# Patient Record
Sex: FEMALE
[De-identification: names, ages, dates, MRNs, and addresses within clinical notes are randomized per-mention and may not be internally consistent; named-entity substitution may affect disease eponyms.]

---

## 2021-10-15 ENCOUNTER — NURSE TRIAGE (OUTPATIENT)
Dept: OTHER | Facility: CLINIC | Age: 61
End: 2021-10-15

## 2021-10-15 NOTE — TELEPHONE ENCOUNTER
Reason for Disposition   Health Information question, no triage required and triager able to answer question    Answer Assessment - Initial Assessment Questions  1. REASON FOR CALL or QUESTION: \"What is your reason for calling today? \" or \"How can I best help you? \" or \"What question do you have that I can help answer? \"      I need to get my Pfizer booster shot and the flu shot, can I get them at the same time? Protocols used: INFORMATION ONLY CALL - NO TRIAGE-ADULT-    Brief description of triage: info only    Triage indicates for patient to home care    Care advice provided, patient verbalizes understanding; denies any other questions or concerns; instructed to call back for any new or worsening symptoms. This triage is a result of a call to [address] [place] [address]. Please do not respond to the triage nurse through this encounter. Any subsequent communication should be directly with the patient. Information given to caller from the [facility] website:    Yes, you can get a COVID-19 vaccine and a flu vaccine at the same time. Even though both vaccines can be given at the same visit, people should follow the recommended schedule for either vaccine: If you havent gotten your currently recommended doses of COVID-19 vaccine, get a COVID-19 vaccine as soon as you can, and ideally get a flu vaccine by the end of October. While limited data exist on giving COVID-19 vaccines with other vaccines, including flu vaccines, experience with giving other vaccines together has shown the way our bodies develop protection and possible side effects are generally similar whether vaccines are given alone or with other vaccines. Yes, you can get a flu vaccine at the same time you get a COVID-19 vaccine, including a COVID-19 booster shot.